# Patient Record
Sex: MALE | Race: WHITE | NOT HISPANIC OR LATINO
[De-identification: names, ages, dates, MRNs, and addresses within clinical notes are randomized per-mention and may not be internally consistent; named-entity substitution may affect disease eponyms.]

---

## 2020-11-25 ENCOUNTER — APPOINTMENT (OUTPATIENT)
Dept: OTOLARYNGOLOGY | Facility: CLINIC | Age: 75
End: 2020-11-25
Payer: MEDICARE

## 2020-11-25 VITALS
HEART RATE: 66 BPM | BODY MASS INDEX: 28.35 KG/M2 | WEIGHT: 198 LBS | TEMPERATURE: 97.3 F | SYSTOLIC BLOOD PRESSURE: 148 MMHG | HEIGHT: 70 IN | DIASTOLIC BLOOD PRESSURE: 83 MMHG

## 2020-11-25 DIAGNOSIS — J34.2 DEVIATED NASAL SEPTUM: ICD-10-CM

## 2020-11-25 DIAGNOSIS — Z86.79 PERSONAL HISTORY OF OTHER DISEASES OF THE CIRCULATORY SYSTEM: ICD-10-CM

## 2020-11-25 DIAGNOSIS — H91.90 UNSPECIFIED HEARING LOSS, UNSPECIFIED EAR: ICD-10-CM

## 2020-11-25 DIAGNOSIS — H91.8X3 OTHER SPECIFIED HEARING LOSS, BILATERAL: ICD-10-CM

## 2020-11-25 DIAGNOSIS — Z86.39 PERSONAL HISTORY OF OTHER ENDOCRINE, NUTRITIONAL AND METABOLIC DISEASE: ICD-10-CM

## 2020-11-25 PROCEDURE — 92557 COMPREHENSIVE HEARING TEST: CPT

## 2020-11-25 PROCEDURE — 92550 TYMPANOMETRY & REFLEX THRESH: CPT

## 2020-11-25 PROCEDURE — 99203 OFFICE O/P NEW LOW 30 MIN: CPT

## 2020-11-25 RX ORDER — ASPIRIN 81 MG
81 TABLET,CHEWABLE ORAL
Refills: 0 | Status: ACTIVE | COMMUNITY

## 2020-11-25 NOTE — ADDENDUM
[FreeTextEntry1] : Documented by Adore Joe acting as scribe for Dr. Mcclain on 11/25/2020.\par \par All Medical record entries made by the Scribe were at my, Dr. Mcclain, direction and personally dictated by me on 11/25/2020 . I have reviewed the chart and agree that the record accurately reflects my personal performance of the history, physical exam, assessment and plan. I have also personally directed, reviewed, and agreed with the discharge instructions.

## 2020-11-25 NOTE — HISTORY OF PRESENT ILLNESS
[de-identified] : The patient presents with hearing loss. Pt doesn't feel that he has a hearing loss but everyone else is the complaining about his hearing. Denies tinnitus or dizziness. 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 He had noise exposure from working at the phone company on circuits and the noise in the environment. He has left work with his ears ringing. Denies FMx of hearing loss. Denies shooting a gun other than when he was in the [employer]. He did not have a hearing test when he began in the . He did have a hearing test when he began at the phone company and at the post office.

## 2020-11-25 NOTE — ASSESSMENT
[FreeTextEntry1] : Audio: bilateral mild sloping to profound SNHL, right 88% discrimination at 90 db, left 72% discrimination at 85 db, tymps nl\par \par Plan:\par Audio. Consider amplification. Discussed r/b/a of of getting amplification sooner rather than later. ABR. FU after test.

## 2020-11-25 NOTE — PHYSICAL EXAM
[Hearing Francis Test (Tuning Fork On Forehead)] : no lateralization of tone [] : septum deviated to the right [Midline] : trachea located in midline position [Normal] : no rashes [FreeTextEntry6] : no pain with motion of the auricle or pressure on the tragus [FreeTextEntry7] : no pain with motion of the auricle or pressure on the tragus  [de-identified] : nontender [de-identified] : nontender [de-identified] : class 1 [FreeTextEntry2] : sinuses nontender to percussion  [de-identified] : sensations intact

## 2020-11-25 NOTE — REVIEW OF SYSTEMS
[Hearing Loss] : hearing loss [Problem Snoring] : problem snoring [Snoring With Pauses] : snoring with pauses

## 2020-11-25 NOTE — CONSULT LETTER
[Dear  ___] : Dear  [unfilled], [Consult Letter:] : I had the pleasure of evaluating your patient, [unfilled]. [Please see my note below.] : Please see my note below. [Consult Closing:] : Thank you very much for allowing me to participate in the care of this patient.  If you have any questions, please do not hesitate to contact me. [Sincerely,] : Sincerely, [FreeTextEntry3] : Jose M Mcclain MD FACS

## 2021-01-01 ENCOUNTER — TRANSCRIPTION ENCOUNTER (OUTPATIENT)
Age: 76
End: 2021-01-01

## 2022-01-01 ENCOUNTER — OUTPATIENT (OUTPATIENT)
Dept: INPATIENT UNIT | Facility: HOSPITAL | Age: 77
LOS: 1 days | End: 2022-01-01
Payer: MEDICARE

## 2022-01-01 ENCOUNTER — NON-APPOINTMENT (OUTPATIENT)
Age: 77
End: 2022-01-01

## 2022-01-01 ENCOUNTER — APPOINTMENT (OUTPATIENT)
Dept: ELECTROPHYSIOLOGY | Facility: CLINIC | Age: 77
End: 2022-01-01
Payer: MEDICARE

## 2022-01-01 ENCOUNTER — TRANSCRIPTION ENCOUNTER (OUTPATIENT)
Age: 77
End: 2022-01-01

## 2022-01-01 ENCOUNTER — INPATIENT (INPATIENT)
Facility: HOSPITAL | Age: 77
LOS: 1 days | Discharge: HOME | DRG: 291 | End: 2022-04-22
Attending: INTERNAL MEDICINE | Admitting: INTERNAL MEDICINE
Payer: MEDICARE

## 2022-01-01 VITALS
HEART RATE: 108 BPM | WEIGHT: 163 LBS | OXYGEN SATURATION: 98 % | HEIGHT: 70 IN | BODY MASS INDEX: 23.34 KG/M2 | DIASTOLIC BLOOD PRESSURE: 73 MMHG | SYSTOLIC BLOOD PRESSURE: 116 MMHG

## 2022-01-01 VITALS
TEMPERATURE: 97 F | OXYGEN SATURATION: 96 % | HEART RATE: 92 BPM | SYSTOLIC BLOOD PRESSURE: 101 MMHG | RESPIRATION RATE: 18 BRPM | DIASTOLIC BLOOD PRESSURE: 58 MMHG

## 2022-01-01 VITALS
RESPIRATION RATE: 18 BRPM | DIASTOLIC BLOOD PRESSURE: 90 MMHG | HEIGHT: 70 IN | SYSTOLIC BLOOD PRESSURE: 115 MMHG | TEMPERATURE: 98 F | OXYGEN SATURATION: 97 % | WEIGHT: 162.92 LBS | HEART RATE: 110 BPM

## 2022-01-01 VITALS
RESPIRATION RATE: 16 BRPM | HEART RATE: 96 BPM | OXYGEN SATURATION: 96 % | DIASTOLIC BLOOD PRESSURE: 80 MMHG | SYSTOLIC BLOOD PRESSURE: 106 MMHG

## 2022-01-01 VITALS
RESPIRATION RATE: 16 BRPM | HEART RATE: 88 BPM | OXYGEN SATURATION: 96 % | SYSTOLIC BLOOD PRESSURE: 97 MMHG | DIASTOLIC BLOOD PRESSURE: 59 MMHG | HEIGHT: 70 IN | TEMPERATURE: 98 F | WEIGHT: 164.91 LBS

## 2022-01-01 VITALS
HEART RATE: 99 BPM | WEIGHT: 165 LBS | SYSTOLIC BLOOD PRESSURE: 114 MMHG | OXYGEN SATURATION: 98 % | DIASTOLIC BLOOD PRESSURE: 78 MMHG | HEIGHT: 70 IN | BODY MASS INDEX: 23.62 KG/M2

## 2022-01-01 DIAGNOSIS — Z01.818 ENCOUNTER FOR OTHER PREPROCEDURAL EXAMINATION: ICD-10-CM

## 2022-01-01 DIAGNOSIS — Z98.890 OTHER SPECIFIED POSTPROCEDURAL STATES: Chronic | ICD-10-CM

## 2022-01-01 DIAGNOSIS — I50.9 HEART FAILURE, UNSPECIFIED: ICD-10-CM

## 2022-01-01 DIAGNOSIS — I25.5 ISCHEMIC CARDIOMYOPATHY: ICD-10-CM

## 2022-01-01 DIAGNOSIS — R06.02 SHORTNESS OF BREATH: ICD-10-CM

## 2022-01-01 DIAGNOSIS — I25.10 ATHEROSCLEROTIC HEART DISEASE OF NATIVE CORONARY ARTERY W/OUT ANGINA PECTORIS: ICD-10-CM

## 2022-01-01 LAB
ALBUMIN SERPL ELPH-MCNC: 3.7 G/DL — SIGNIFICANT CHANGE UP (ref 3.3–5)
ALBUMIN SERPL ELPH-MCNC: 4 G/DL — SIGNIFICANT CHANGE UP (ref 3.3–5)
ALBUMIN SERPL ELPH-MCNC: 4.7 G/DL
ALP BLD-CCNC: 44 U/L
ALP SERPL-CCNC: 38 U/L — LOW (ref 40–120)
ALP SERPL-CCNC: 41 U/L — SIGNIFICANT CHANGE UP (ref 40–120)
ALT FLD-CCNC: 7 U/L — LOW (ref 10–45)
ALT FLD-CCNC: 9 U/L — LOW (ref 10–45)
ALT SERPL-CCNC: 9 U/L
ANION GAP SERPL CALC-SCNC: 16 MMOL/L — SIGNIFICANT CHANGE UP (ref 5–17)
ANION GAP SERPL CALC-SCNC: 16 MMOL/L — SIGNIFICANT CHANGE UP (ref 5–17)
ANION GAP SERPL CALC-SCNC: 17 MMOL/L — SIGNIFICANT CHANGE UP (ref 5–17)
ANION GAP SERPL CALC-SCNC: 19 MMOL/L
AST SERPL-CCNC: 12 U/L — SIGNIFICANT CHANGE UP (ref 10–40)
AST SERPL-CCNC: 13 U/L
AST SERPL-CCNC: 9 U/L — LOW (ref 10–40)
BASE EXCESS BLDV CALC-SCNC: 1.9 MMOL/L — SIGNIFICANT CHANGE UP (ref -2–2)
BASOPHILS # BLD AUTO: 0.05 K/UL — SIGNIFICANT CHANGE UP (ref 0–0.2)
BASOPHILS # BLD AUTO: 0.06 K/UL
BASOPHILS NFR BLD AUTO: 0.5 % — SIGNIFICANT CHANGE UP (ref 0–2)
BASOPHILS NFR BLD AUTO: 0.9 %
BILIRUB SERPL-MCNC: 0.6 MG/DL
BILIRUB SERPL-MCNC: 1 MG/DL — SIGNIFICANT CHANGE UP (ref 0.2–1.2)
BILIRUB SERPL-MCNC: 1.2 MG/DL — SIGNIFICANT CHANGE UP (ref 0.2–1.2)
BLD GP AB SCN SERPL QL: NEGATIVE — SIGNIFICANT CHANGE UP
BUN SERPL-MCNC: 15 MG/DL
BUN SERPL-MCNC: 18 MG/DL — SIGNIFICANT CHANGE UP (ref 7–23)
BUN SERPL-MCNC: 18 MG/DL — SIGNIFICANT CHANGE UP (ref 7–23)
BUN SERPL-MCNC: 23 MG/DL — SIGNIFICANT CHANGE UP (ref 7–23)
CA-I SERPL-SCNC: 1.17 MMOL/L — SIGNIFICANT CHANGE UP (ref 1.15–1.33)
CALCIUM SERPL-MCNC: 9.4 MG/DL — SIGNIFICANT CHANGE UP (ref 8.4–10.5)
CALCIUM SERPL-MCNC: 9.5 MG/DL — SIGNIFICANT CHANGE UP (ref 8.4–10.5)
CALCIUM SERPL-MCNC: 9.6 MG/DL — SIGNIFICANT CHANGE UP (ref 8.4–10.5)
CALCIUM SERPL-MCNC: 9.8 MG/DL
CHLORIDE BLDV-SCNC: 102 MMOL/L — SIGNIFICANT CHANGE UP (ref 96–108)
CHLORIDE SERPL-SCNC: 101 MMOL/L — SIGNIFICANT CHANGE UP (ref 96–108)
CHLORIDE SERPL-SCNC: 101 MMOL/L — SIGNIFICANT CHANGE UP (ref 96–108)
CHLORIDE SERPL-SCNC: 102 MMOL/L — SIGNIFICANT CHANGE UP (ref 96–108)
CHLORIDE SERPL-SCNC: 98 MMOL/L
CO2 BLDV-SCNC: 28 MMOL/L — HIGH (ref 22–26)
CO2 SERPL-SCNC: 20 MMOL/L — LOW (ref 22–31)
CO2 SERPL-SCNC: 20 MMOL/L — LOW (ref 22–31)
CO2 SERPL-SCNC: 22 MMOL/L — SIGNIFICANT CHANGE UP (ref 22–31)
CO2 SERPL-SCNC: 23 MMOL/L
CREAT SERPL-MCNC: 1.14 MG/DL — SIGNIFICANT CHANGE UP (ref 0.5–1.3)
CREAT SERPL-MCNC: 1.31 MG/DL — HIGH (ref 0.5–1.3)
CREAT SERPL-MCNC: 1.34 MG/DL — HIGH (ref 0.5–1.3)
CREAT SERPL-MCNC: 1.38 MG/DL
EGFR: 53 ML/MIN/1.73M2
EGFR: 55 ML/MIN/1.73M2 — LOW
EGFR: 57 ML/MIN/1.73M2 — LOW
EGFR: 67 ML/MIN/1.73M2 — SIGNIFICANT CHANGE UP
EOSINOPHIL # BLD AUTO: 0.03 K/UL — SIGNIFICANT CHANGE UP (ref 0–0.5)
EOSINOPHIL # BLD AUTO: 0.17 K/UL
EOSINOPHIL NFR BLD AUTO: 0.3 % — SIGNIFICANT CHANGE UP (ref 0–6)
EOSINOPHIL NFR BLD AUTO: 2.4 %
GAS PNL BLDV: 135 MMOL/L — LOW (ref 136–145)
GAS PNL BLDV: SIGNIFICANT CHANGE UP
GAS PNL BLDV: SIGNIFICANT CHANGE UP
GLUCOSE BLDV-MCNC: 129 MG/DL — HIGH (ref 70–99)
GLUCOSE SERPL-MCNC: 107 MG/DL — HIGH (ref 70–99)
GLUCOSE SERPL-MCNC: 113 MG/DL — HIGH (ref 70–99)
GLUCOSE SERPL-MCNC: 133 MG/DL — HIGH (ref 70–99)
GLUCOSE SERPL-MCNC: 136 MG/DL
HCO3 BLDV-SCNC: 27 MMOL/L — SIGNIFICANT CHANGE UP (ref 22–29)
HCT VFR BLD CALC: 36.7 % — LOW (ref 39–50)
HCT VFR BLD CALC: 37.9 % — LOW (ref 39–50)
HCT VFR BLD CALC: 43.5 %
HCT VFR BLDA CALC: 38 % — LOW (ref 39–51)
HCV AB S/CO SERPL IA: 0.14 S/CO — SIGNIFICANT CHANGE UP (ref 0–0.99)
HCV AB SERPL-IMP: SIGNIFICANT CHANGE UP
HGB BLD CALC-MCNC: 12.8 G/DL — SIGNIFICANT CHANGE UP (ref 12.6–17.4)
HGB BLD-MCNC: 12.4 G/DL — LOW (ref 13–17)
HGB BLD-MCNC: 12.7 G/DL — LOW (ref 13–17)
HGB BLD-MCNC: 14.2 G/DL
IMM GRANULOCYTES NFR BLD AUTO: 0.3 %
IMM GRANULOCYTES NFR BLD AUTO: 0.6 % — SIGNIFICANT CHANGE UP (ref 0–1.5)
INR PPP: 1.08 RATIO
LACTATE BLDV-MCNC: 1.9 MMOL/L — SIGNIFICANT CHANGE UP (ref 0.7–2)
LYMPHOCYTES # BLD AUTO: 0.6 K/UL — LOW (ref 1–3.3)
LYMPHOCYTES # BLD AUTO: 1.13 K/UL
LYMPHOCYTES # BLD AUTO: 5.8 % — LOW (ref 13–44)
LYMPHOCYTES NFR BLD AUTO: 16.1 %
MAGNESIUM SERPL-MCNC: 2 MG/DL — SIGNIFICANT CHANGE UP (ref 1.6–2.6)
MAN DIFF?: NORMAL
MCHC RBC-ENTMCNC: 32.1 PG — SIGNIFICANT CHANGE UP (ref 27–34)
MCHC RBC-ENTMCNC: 32.2 PG — SIGNIFICANT CHANGE UP (ref 27–34)
MCHC RBC-ENTMCNC: 32.3 PG
MCHC RBC-ENTMCNC: 32.6 GM/DL
MCHC RBC-ENTMCNC: 33.5 GM/DL — SIGNIFICANT CHANGE UP (ref 32–36)
MCHC RBC-ENTMCNC: 33.8 GM/DL — SIGNIFICANT CHANGE UP (ref 32–36)
MCV RBC AUTO: 95.1 FL — SIGNIFICANT CHANGE UP (ref 80–100)
MCV RBC AUTO: 95.9 FL — SIGNIFICANT CHANGE UP (ref 80–100)
MCV RBC AUTO: 98.9 FL
MONOCYTES # BLD AUTO: 0.63 K/UL
MONOCYTES # BLD AUTO: 0.94 K/UL — HIGH (ref 0–0.9)
MONOCYTES NFR BLD AUTO: 9 %
MONOCYTES NFR BLD AUTO: 9.1 % — SIGNIFICANT CHANGE UP (ref 2–14)
NEUTROPHILS # BLD AUTO: 4.99 K/UL
NEUTROPHILS # BLD AUTO: 8.68 K/UL — HIGH (ref 1.8–7.4)
NEUTROPHILS NFR BLD AUTO: 71.3 %
NEUTROPHILS NFR BLD AUTO: 83.7 % — HIGH (ref 43–77)
NRBC # BLD: 0 /100 WBCS — SIGNIFICANT CHANGE UP (ref 0–0)
NRBC # BLD: 0 /100 WBCS — SIGNIFICANT CHANGE UP (ref 0–0)
NT-PROBNP SERPL-SCNC: HIGH PG/ML (ref 0–300)
NT-PROBNP SERPL-SCNC: HIGH PG/ML (ref 0–300)
PCO2 BLDV: 41 MMHG — LOW (ref 42–55)
PH BLDV: 7.42 — SIGNIFICANT CHANGE UP (ref 7.32–7.43)
PLATELET # BLD AUTO: 186 K/UL — SIGNIFICANT CHANGE UP (ref 150–400)
PLATELET # BLD AUTO: 198 K/UL — SIGNIFICANT CHANGE UP (ref 150–400)
PLATELET # BLD AUTO: 293 K/UL
PO2 BLDV: 26 MMHG — SIGNIFICANT CHANGE UP (ref 25–45)
POTASSIUM BLDV-SCNC: 3.7 MMOL/L — SIGNIFICANT CHANGE UP (ref 3.5–5.1)
POTASSIUM SERPL-MCNC: 3.4 MMOL/L — LOW (ref 3.5–5.3)
POTASSIUM SERPL-MCNC: 3.6 MMOL/L — SIGNIFICANT CHANGE UP (ref 3.5–5.3)
POTASSIUM SERPL-MCNC: 4.4 MMOL/L — SIGNIFICANT CHANGE UP (ref 3.5–5.3)
POTASSIUM SERPL-SCNC: 3.4 MMOL/L — LOW (ref 3.5–5.3)
POTASSIUM SERPL-SCNC: 3.6 MMOL/L — SIGNIFICANT CHANGE UP (ref 3.5–5.3)
POTASSIUM SERPL-SCNC: 4.2 MMOL/L
POTASSIUM SERPL-SCNC: 4.4 MMOL/L — SIGNIFICANT CHANGE UP (ref 3.5–5.3)
PROT SERPL-MCNC: 6.5 G/DL — SIGNIFICANT CHANGE UP (ref 6–8.3)
PROT SERPL-MCNC: 6.6 G/DL — SIGNIFICANT CHANGE UP (ref 6–8.3)
PROT SERPL-MCNC: 7.4 G/DL
PT BLD: 12.6 SEC
RAPID RVP RESULT: SIGNIFICANT CHANGE UP
RBC # BLD: 3.86 M/UL — LOW (ref 4.2–5.8)
RBC # BLD: 3.95 M/UL — LOW (ref 4.2–5.8)
RBC # BLD: 4.4 M/UL
RBC # FLD: 14.2 % — SIGNIFICANT CHANGE UP (ref 10.3–14.5)
RBC # FLD: 14.6 %
RBC # FLD: 14.6 % — HIGH (ref 10.3–14.5)
RH IG SCN BLD-IMP: POSITIVE — SIGNIFICANT CHANGE UP
RH IG SCN BLD-IMP: POSITIVE — SIGNIFICANT CHANGE UP
SAO2 % BLDV: 37.7 % — LOW (ref 67–88)
SARS-COV-2 N GENE NPH QL NAA+PROBE: NOT DETECTED
SARS-COV-2 RNA SPEC QL NAA+PROBE: SIGNIFICANT CHANGE UP
SODIUM SERPL-SCNC: 137 MMOL/L — SIGNIFICANT CHANGE UP (ref 135–145)
SODIUM SERPL-SCNC: 138 MMOL/L — SIGNIFICANT CHANGE UP (ref 135–145)
SODIUM SERPL-SCNC: 140 MMOL/L
SODIUM SERPL-SCNC: 140 MMOL/L — SIGNIFICANT CHANGE UP (ref 135–145)
TROPONIN T, HIGH SENSITIVITY RESULT: 48 NG/L — SIGNIFICANT CHANGE UP (ref 0–51)
WBC # BLD: 10.36 K/UL — SIGNIFICANT CHANGE UP (ref 3.8–10.5)
WBC # BLD: 7.93 K/UL — SIGNIFICANT CHANGE UP (ref 3.8–10.5)
WBC # FLD AUTO: 10.36 K/UL — SIGNIFICANT CHANGE UP (ref 3.8–10.5)
WBC # FLD AUTO: 7 K/UL
WBC # FLD AUTO: 7.93 K/UL — SIGNIFICANT CHANGE UP (ref 3.8–10.5)

## 2022-01-01 PROCEDURE — 85018 HEMOGLOBIN: CPT

## 2022-01-01 PROCEDURE — 83880 ASSAY OF NATRIURETIC PEPTIDE: CPT

## 2022-01-01 PROCEDURE — 85014 HEMATOCRIT: CPT

## 2022-01-01 PROCEDURE — 36415 COLL VENOUS BLD VENIPUNCTURE: CPT

## 2022-01-01 PROCEDURE — 84295 ASSAY OF SERUM SODIUM: CPT

## 2022-01-01 PROCEDURE — 82947 ASSAY GLUCOSE BLOOD QUANT: CPT

## 2022-01-01 PROCEDURE — 80053 COMPREHEN METABOLIC PANEL: CPT

## 2022-01-01 PROCEDURE — 93308 TTE F-UP OR LMTD: CPT

## 2022-01-01 PROCEDURE — 93005 ELECTROCARDIOGRAM TRACING: CPT

## 2022-01-01 PROCEDURE — 83605 ASSAY OF LACTIC ACID: CPT

## 2022-01-01 PROCEDURE — 71046 X-RAY EXAM CHEST 2 VIEWS: CPT

## 2022-01-01 PROCEDURE — 85025 COMPLETE CBC W/AUTO DIFF WBC: CPT

## 2022-01-01 PROCEDURE — 82435 ASSAY OF BLOOD CHLORIDE: CPT

## 2022-01-01 PROCEDURE — C1722: CPT

## 2022-01-01 PROCEDURE — 80048 BASIC METABOLIC PNL TOTAL CA: CPT

## 2022-01-01 PROCEDURE — 93282 PRGRMG EVAL IMPLANTABLE DFB: CPT

## 2022-01-01 PROCEDURE — 93308 TTE F-UP OR LMTD: CPT | Mod: 26

## 2022-01-01 PROCEDURE — 71045 X-RAY EXAM CHEST 1 VIEW: CPT | Mod: 26

## 2022-01-01 PROCEDURE — 99024 POSTOP FOLLOW-UP VISIT: CPT

## 2022-01-01 PROCEDURE — 71046 X-RAY EXAM CHEST 2 VIEWS: CPT | Mod: 26

## 2022-01-01 PROCEDURE — 99285 EMERGENCY DEPT VISIT HI MDM: CPT

## 2022-01-01 PROCEDURE — 99223 1ST HOSP IP/OBS HIGH 75: CPT

## 2022-01-01 PROCEDURE — C1769: CPT

## 2022-01-01 PROCEDURE — 82330 ASSAY OF CALCIUM: CPT

## 2022-01-01 PROCEDURE — 84132 ASSAY OF SERUM POTASSIUM: CPT

## 2022-01-01 PROCEDURE — 93321 DOPPLER ECHO F-UP/LMTD STD: CPT | Mod: 26

## 2022-01-01 PROCEDURE — 86900 BLOOD TYPING SEROLOGIC ABO: CPT

## 2022-01-01 PROCEDURE — 93321 DOPPLER ECHO F-UP/LMTD STD: CPT

## 2022-01-01 PROCEDURE — C1894: CPT

## 2022-01-01 PROCEDURE — 86850 RBC ANTIBODY SCREEN: CPT

## 2022-01-01 PROCEDURE — 71045 X-RAY EXAM CHEST 1 VIEW: CPT

## 2022-01-01 PROCEDURE — 0225U NFCT DS DNA&RNA 21 SARSCOV2: CPT

## 2022-01-01 PROCEDURE — 85027 COMPLETE CBC AUTOMATED: CPT

## 2022-01-01 PROCEDURE — 86803 HEPATITIS C AB TEST: CPT

## 2022-01-01 PROCEDURE — C1777: CPT

## 2022-01-01 PROCEDURE — 33249 INSJ/RPLCMT DEFIB W/LEAD(S): CPT

## 2022-01-01 PROCEDURE — 99285 EMERGENCY DEPT VISIT HI MDM: CPT | Mod: 25

## 2022-01-01 PROCEDURE — 93010 ELECTROCARDIOGRAM REPORT: CPT

## 2022-01-01 PROCEDURE — 83735 ASSAY OF MAGNESIUM: CPT

## 2022-01-01 PROCEDURE — 82803 BLOOD GASES ANY COMBINATION: CPT

## 2022-01-01 PROCEDURE — 99204 OFFICE O/P NEW MOD 45 MIN: CPT

## 2022-01-01 PROCEDURE — 86901 BLOOD TYPING SEROLOGIC RH(D): CPT

## 2022-01-01 PROCEDURE — 93000 ELECTROCARDIOGRAM COMPLETE: CPT

## 2022-01-01 PROCEDURE — 84484 ASSAY OF TROPONIN QUANT: CPT

## 2022-01-01 RX ORDER — LOSARTAN POTASSIUM 100 MG/1
12.5 TABLET, FILM COATED ORAL DAILY
Refills: 0 | Status: DISCONTINUED | OUTPATIENT
Start: 2022-01-01 | End: 2022-01-01

## 2022-01-01 RX ORDER — LOSARTAN POTASSIUM 100 MG/1
0.5 TABLET, FILM COATED ORAL
Qty: 0 | Refills: 0 | DISCHARGE

## 2022-01-01 RX ORDER — METOPROLOL TARTRATE 50 MG
1 TABLET ORAL
Qty: 0 | Refills: 0 | DISCHARGE

## 2022-01-01 RX ORDER — OXYCODONE HYDROCHLORIDE 5 MG/1
1 TABLET ORAL
Qty: 8 | Refills: 0
Start: 2022-01-01 | End: 2022-01-01

## 2022-01-01 RX ORDER — CLOPIDOGREL BISULFATE 75 MG/1
75 TABLET, FILM COATED ORAL DAILY
Refills: 0 | Status: DISCONTINUED | OUTPATIENT
Start: 2022-01-01 | End: 2022-01-01

## 2022-01-01 RX ORDER — HEPARIN SODIUM 5000 [USP'U]/ML
5000 INJECTION INTRAVENOUS; SUBCUTANEOUS EVERY 12 HOURS
Refills: 0 | Status: DISCONTINUED | OUTPATIENT
Start: 2022-01-01 | End: 2022-01-01

## 2022-01-01 RX ORDER — POTASSIUM CHLORIDE 750 MG/1
10 TABLET, FILM COATED, EXTENDED RELEASE ORAL
Qty: 90 | Refills: 0 | Status: ACTIVE | COMMUNITY
Start: 2022-01-01

## 2022-01-01 RX ORDER — ASPIRIN/CALCIUM CARB/MAGNESIUM 324 MG
81 TABLET ORAL DAILY
Refills: 0 | Status: DISCONTINUED | OUTPATIENT
Start: 2022-01-01 | End: 2022-01-01

## 2022-01-01 RX ORDER — METOPROLOL SUCCINATE 25 MG/1
25 TABLET, EXTENDED RELEASE ORAL
Qty: 30 | Refills: 0 | Status: ACTIVE | COMMUNITY
Start: 2022-01-01

## 2022-01-01 RX ORDER — OXYCODONE HYDROCHLORIDE 5 MG/1
5 TABLET ORAL EVERY 6 HOURS
Refills: 0 | Status: DISCONTINUED | OUTPATIENT
Start: 2022-01-01 | End: 2022-01-01

## 2022-01-01 RX ORDER — ACETAMINOPHEN 500 MG
650 TABLET ORAL EVERY 6 HOURS
Refills: 0 | Status: DISCONTINUED | OUTPATIENT
Start: 2022-01-01 | End: 2022-01-01

## 2022-01-01 RX ORDER — POTASSIUM CHLORIDE 20 MEQ
1 PACKET (EA) ORAL
Qty: 0 | Refills: 0 | DISCHARGE

## 2022-01-01 RX ORDER — FUROSEMIDE 40 MG
80 TABLET ORAL ONCE
Refills: 0 | Status: COMPLETED | OUTPATIENT
Start: 2022-01-01 | End: 2022-01-01

## 2022-01-01 RX ORDER — FAMOTIDINE 20 MG/1
20 TABLET, FILM COATED ORAL
Qty: 180 | Refills: 3 | Status: ACTIVE | COMMUNITY
Start: 2022-01-01

## 2022-01-01 RX ORDER — LOSARTAN POTASSIUM 100 MG/1
1 TABLET, FILM COATED ORAL
Qty: 0 | Refills: 0 | DISCHARGE

## 2022-01-01 RX ORDER — PANTOPRAZOLE SODIUM 20 MG/1
40 TABLET, DELAYED RELEASE ORAL
Refills: 0 | Status: DISCONTINUED | OUTPATIENT
Start: 2022-01-01 | End: 2022-01-01

## 2022-01-01 RX ORDER — ALPRAZOLAM 0.25 MG
0.25 TABLET ORAL AT BEDTIME
Refills: 0 | Status: DISCONTINUED | OUTPATIENT
Start: 2022-01-01 | End: 2022-01-01

## 2022-01-01 RX ORDER — FUROSEMIDE 40 MG/1
40 TABLET ORAL DAILY
Qty: 14 | Refills: 0 | Status: ACTIVE | COMMUNITY
Start: 2022-01-01

## 2022-01-01 RX ORDER — ALPRAZOLAM 0.25 MG
1 TABLET ORAL
Qty: 0 | Refills: 0 | DISCHARGE

## 2022-01-01 RX ORDER — METOPROLOL TARTRATE 50 MG
25 TABLET ORAL DAILY
Refills: 0 | Status: DISCONTINUED | OUTPATIENT
Start: 2022-01-01 | End: 2022-01-01

## 2022-01-01 RX ORDER — FUROSEMIDE 40 MG
40 TABLET ORAL ONCE
Refills: 0 | Status: COMPLETED | OUTPATIENT
Start: 2022-01-01 | End: 2022-01-01

## 2022-01-01 RX ORDER — FUROSEMIDE 40 MG
1 TABLET ORAL
Qty: 0 | Refills: 0 | DISCHARGE

## 2022-01-01 RX ORDER — LOSARTAN POTASSIUM 25 MG/1
25 TABLET, FILM COATED ORAL DAILY
Qty: 30 | Refills: 0 | Status: ACTIVE | COMMUNITY
Start: 2022-01-01

## 2022-01-01 RX ORDER — ACETAMINOPHEN 500 MG
1000 TABLET ORAL ONCE
Refills: 0 | Status: COMPLETED | OUTPATIENT
Start: 2022-01-01 | End: 2022-01-01

## 2022-01-01 RX ORDER — METOPROLOL SUCCINATE 100 MG
100 TABLET, EXTENDED RELEASE 24 HR ORAL
Refills: 0 | Status: DISCONTINUED | COMMUNITY
End: 2022-01-01

## 2022-01-01 RX ORDER — FUROSEMIDE 40 MG
40 TABLET ORAL
Refills: 0 | Status: DISCONTINUED | OUTPATIENT
Start: 2022-01-01 | End: 2022-01-01

## 2022-01-01 RX ORDER — ASPIRIN/CALCIUM CARB/MAGNESIUM 324 MG
1 TABLET ORAL
Qty: 0 | Refills: 0 | DISCHARGE

## 2022-01-01 RX ORDER — FAMOTIDINE 10 MG/ML
1 INJECTION INTRAVENOUS
Qty: 0 | Refills: 0 | DISCHARGE

## 2022-01-01 RX ORDER — ALPRAZOLAM 0.5 MG/1
0.5 TABLET ORAL
Qty: 21 | Refills: 0 | Status: ACTIVE | COMMUNITY
Start: 2022-01-01

## 2022-01-01 RX ORDER — LOSARTAN POTASSIUM 100 MG/1
25 TABLET, FILM COATED ORAL DAILY
Refills: 0 | Status: DISCONTINUED | OUTPATIENT
Start: 2022-01-01 | End: 2022-01-01

## 2022-01-01 RX ORDER — ALPRAZOLAM 0.25 MG
2 TABLET ORAL
Qty: 0 | Refills: 0 | DISCHARGE

## 2022-01-01 RX ORDER — CLOPIDOGREL BISULFATE 75 MG/1
75 TABLET, FILM COATED ORAL DAILY
Qty: 1 | Refills: 1 | Status: ACTIVE | COMMUNITY
Start: 2022-01-01

## 2022-01-01 RX ORDER — ACETAMINOPHEN 500 MG
2 TABLET ORAL
Qty: 0 | Refills: 0 | DISCHARGE
Start: 2022-01-01

## 2022-01-01 RX ORDER — POTASSIUM CHLORIDE 20 MEQ
40 PACKET (EA) ORAL EVERY 4 HOURS
Refills: 0 | Status: COMPLETED | OUTPATIENT
Start: 2022-01-01 | End: 2022-01-01

## 2022-01-01 RX ORDER — OXYCODONE HYDROCHLORIDE 5 MG/1
1 TABLET ORAL
Qty: 0 | Refills: 0 | DISCHARGE
Start: 2022-01-01

## 2022-01-01 RX ORDER — CEFAZOLIN SODIUM 1 G
2000 VIAL (EA) INJECTION ONCE
Refills: 0 | Status: COMPLETED | OUTPATIENT
Start: 2022-01-01 | End: 2022-01-01

## 2022-01-01 RX ORDER — CLOPIDOGREL BISULFATE 75 MG/1
1 TABLET, FILM COATED ORAL
Qty: 0 | Refills: 0 | DISCHARGE

## 2022-01-01 RX ADMIN — Medication 40 MILLIEQUIVALENT(S): at 10:35

## 2022-01-01 RX ADMIN — CLOPIDOGREL BISULFATE 75 MILLIGRAM(S): 75 TABLET, FILM COATED ORAL at 11:50

## 2022-01-01 RX ADMIN — CLOPIDOGREL BISULFATE 75 MILLIGRAM(S): 75 TABLET, FILM COATED ORAL at 13:31

## 2022-01-01 RX ADMIN — Medication 40 MILLIGRAM(S): at 05:36

## 2022-01-01 RX ADMIN — Medication 81 MILLIGRAM(S): at 11:37

## 2022-01-01 RX ADMIN — Medication 0.25 MILLIGRAM(S): at 02:18

## 2022-01-01 RX ADMIN — Medication 81 MILLIGRAM(S): at 13:31

## 2022-01-01 RX ADMIN — HEPARIN SODIUM 5000 UNIT(S): 5000 INJECTION INTRAVENOUS; SUBCUTANEOUS at 06:06

## 2022-01-01 RX ADMIN — HEPARIN SODIUM 5000 UNIT(S): 5000 INJECTION INTRAVENOUS; SUBCUTANEOUS at 18:22

## 2022-01-01 RX ADMIN — Medication 81 MILLIGRAM(S): at 11:50

## 2022-01-01 RX ADMIN — PANTOPRAZOLE SODIUM 40 MILLIGRAM(S): 20 TABLET, DELAYED RELEASE ORAL at 05:39

## 2022-01-01 RX ADMIN — Medication 40 MILLIGRAM(S): at 18:17

## 2022-01-01 RX ADMIN — Medication 25 MILLIGRAM(S): at 05:38

## 2022-01-01 RX ADMIN — Medication 25 MILLIGRAM(S): at 06:06

## 2022-01-01 RX ADMIN — LOSARTAN POTASSIUM 12.5 MILLIGRAM(S): 100 TABLET, FILM COATED ORAL at 05:37

## 2022-01-01 RX ADMIN — Medication 40 MILLIGRAM(S): at 06:05

## 2022-01-01 RX ADMIN — PANTOPRAZOLE SODIUM 40 MILLIGRAM(S): 20 TABLET, DELAYED RELEASE ORAL at 06:06

## 2022-01-01 RX ADMIN — Medication 400 MILLIGRAM(S): at 12:36

## 2022-01-01 RX ADMIN — Medication 1000 MILLIGRAM(S): at 13:36

## 2022-01-01 RX ADMIN — Medication 40 MILLIEQUIVALENT(S): at 13:31

## 2022-01-01 RX ADMIN — HEPARIN SODIUM 5000 UNIT(S): 5000 INJECTION INTRAVENOUS; SUBCUTANEOUS at 18:17

## 2022-01-01 RX ADMIN — Medication 40 MILLIGRAM(S): at 18:22

## 2022-01-01 RX ADMIN — LOSARTAN POTASSIUM 25 MILLIGRAM(S): 100 TABLET, FILM COATED ORAL at 06:05

## 2022-01-01 RX ADMIN — Medication 0.25 MILLIGRAM(S): at 22:13

## 2022-01-01 RX ADMIN — HEPARIN SODIUM 5000 UNIT(S): 5000 INJECTION INTRAVENOUS; SUBCUTANEOUS at 05:35

## 2022-01-01 RX ADMIN — Medication 40 MILLIGRAM(S): at 10:40

## 2022-03-28 PROBLEM — R06.02 SHORTNESS OF BREATH: Status: ACTIVE | Noted: 2022-01-01

## 2022-03-29 NOTE — HISTORY OF PRESENT ILLNESS
[FreeTextEntry1] : I had the pleasure of seeing your patient Roger Irwin today in the arrhythmia clinic of [facility]. As you well know the patient is a delightful 76 year old man with a histroy of ischemic CM and recent MI. The patient has a prior history of CAD including multiple stents over the years at [facility] (starting [id number] with 7 stents). The patient was in [place] and suffered a STEMI on 1/6/22. He was taken to the cath lab and found to have an occluded LAD. An attempt was made to to open the vessel with only sluggish flow. He had disease in his circumflex system and RCA as well. His EF post was 30-35% initially (It was 45% prior according to the patients wife). He was given a lifevest and returned to [place]. A more recent echo demonstrates an EF of 20% according to his wife. He was seen by the heart failure team at [place]. He did not tolerate Entresto due to hypotention (again according to the patients wife). I spoke with Dr. Richards who saw him recently for heart failure. She reviewed his angiogram with her team and it was felt that there was nothing to intervene upon with CABG or PCI. \par \par Overall the patient is doing fairly well. He wants to continue cardiac rehab, but was having PVCs and this was put on hold. He denies palpitations, lightheadedness, pre-syncope or syncope. His BP today was 116/73 and his pulse 105. His ECG demonstrated sinus tachycardia at 108 and evidence of an mana-septal MI.

## 2022-03-29 NOTE — DISCUSSION/SUMMARY
[FreeTextEntry1] : In summary the patient is a 76 year old man with ischemic CM, status post a recent MI and PCI. I discussed with the patient and his wife that there was no data to support the use of a life vest (The VEST trial was negative, with a trend toward mortality driven by 4 strokes in the non-Vest arm-not sudden death). he is very happy to stop wearing the vest. We also received the data supporting ICD therapy for primary prevention of sudden death. We discussed he pros and cons of ICD therapy and the procedures and risks. I advised them that he would need a repeat echocardiogram 3 months after his PCI (After 4/6/22) and if the EF was still <35% (whuch it most likely will be) he would be a candidate for an ICD. We discussed the LEADR trial (3830D ICD lead, based on the 3830 4Fr select secure pacing lead). He is amenable to enrolling.

## 2022-04-07 PROBLEM — Z01.818 PREOP TESTING: Status: ACTIVE | Noted: 2022-01-01

## 2022-04-14 NOTE — ASU DISCHARGE PLAN (ADULT/PEDIATRIC) - CARE PROVIDER_API CALL
Hayley Mcdaniel)  Cardiac Electrophysiology; Cardiovascular Disease; Internal Medicine  [address] [place] [address]  Phone: [phone]  Fax: [phone]  Scheduled Appointment: 04/26/2022 11:40 AM

## 2022-04-14 NOTE — ASU DISCHARGE PLAN (ADULT/PEDIATRIC) - ASU DC SPECIAL INSTRUCTIONSFT
WOUND CARE:  Do NOT scrub, rub, or pick at your incision site  AFTER 3 DAYS you may SHOWER  - use mild soap and gentle warm, water stream, pat dry  DO NOT apply lotions, creams, ointments, powder, parfumes to your incision site  DO NOT SOAK your site for 4-6 weeks ( no baths, no pools, no tubs, etc...)  wear loose clothing around site for 1-2 weeks  IF surgical tape was used DO NOT remove the strips, they will fall off after 7days, if glue was used, it will naturally fall off within 3 weeks  if staples were used, they will b eremoved in 7-10 days by your doctor    ACTIVITY:  for 2 weeks AFTER  your procedure  - DO NOT RAISE your arm above shoulder level ( on the same side of your incision)  for 4 weeks AFTER your procedure   - DO NOT LIFT anything 10 lbs or heavier ( on the side of your impalnt)   - certain activities may be limited longer, those that involve swinging your arm, and will be discuseed with your EP doctor  DO NOT DRIVE until your EP Doctor or nurse practitioner/ physician assistant states it is safe to do so  A follow up appointment in 7-14 days will be arranged before your discharge    ID CARD:   you will receive an ID CARD and device company booklet   - please carry that card with you at all times    ***CALL YOUR DOCTOR ***  IF you have fever, chils, body aches, or severe pain, swelling, redness, heat, yellow drainage from your incision site  IF bleeding  or significnat new swelling from your puncture site  IF your experience lightheadness, dizziness, or fainting spell.  If you are unable to reach your doctor, you may contact Cardiology Office at [facility] at [phone]  IF you experience a SINGLE SHOCK and feel okay please call EP clinic   IF your experience MULTIPLE SHOCKS or signle shock and you are NOT FEELING good, call [phone] or have someone take you to nearest ER      Follow heart healthy diet reccomended by your doctor, , if you smoke STOP SMOKING ( may call [phone] for center of tobacco control if you need assistance)

## 2022-04-14 NOTE — CHART NOTE - NSCHARTNOTEFT_GEN_A_CORE
Search Terms: laura irwin, 1945Search Date: 04/14/2022 10:15:37 AM    The Drug Utilization Report below displays all of the controlled substance prescriptions, if any, that your patient has filled in the last twelve months. The information displayed on this report is compiled from pharmacy submissions to the Department, and accurately reflects the information as submitted by the pharmacies.    This report was requested by: Janna Colindres | Reference #: [id number]    Others' Prescriptions  Patient Name: Laura IrwinBirth Date: 1945  Address: [address] [place] [id number]: Male  Rx Written	Rx Dispensed	Drug	Quantity	Days Supply	Prescriber Name	Prescriber Ruth #	Payment Method	Dispenser  04/06/2022	04/06/2022	alprazolam 0.25 mg tablet	90	30	Nicolasa Shepard MD	[id number]	Medicare	[id number]

## 2022-04-14 NOTE — CHART NOTE - NSCHARTNOTEFT_GEN_A_CORE
S/P /ICD implant     T(C): 36.4 (04-14-22 @ 07:10), Max: 36.4 (04-14-22 @ 07:10)  HR: 103 (04-14-22 @ 10:10) (100 - 110)  BP: 104/83 (04-14-22 @ 10:10) (104/83 - 118/96)  RR: 17 (04-14-22 @ 10:10) (16 - 18)  SpO2: 92% (04-14-22 @ 10:10) (92% - 97%)    Left infraclavicular implant site no hematoma, no bleeding, no ecchymosis  Post Device teaching done with patient and fiancee' by nurse   ICD/ card, booklet, and discharge instruction given to patient by primary nurse   awaiting CXR to r/o pneumothorax and check lead tip placement  anticipate discharge home today after CXR, if remains stable  f.u paresh check appt 4/26 at 11:40 am S/P /ICD implant     T(C): 36.4 (04-14-22 @ 07:10), Max: 36.4 (04-14-22 @ 07:10)  HR: 103 (04-14-22 @ 10:10) (100 - 110)  BP: 104/83 (04-14-22 @ 10:10) (104/83 - 118/96)  RR: 17 (04-14-22 @ 10:10) (16 - 18)  SpO2: 92% (04-14-22 @ 10:10) (92% - 97%)    Left infraclavicular implant site no hematoma, no bleeding, no ecchymosis  Post Device teaching done with patient and fiancee' by nurse   ICD/ card, booklet, and discharge instruction given to patient by primary nurse   awaiting CXR to r/o pneumothorax and check lead tip placement  anticipate discharge home today after CXR, if remains stable  f.u wound check appt 4/26 at 11:40 am    ADDENDUM TO ABOVE NOTE  called at bedside by nurse: Left infraclavicular implant site with "slight" blood oozing  went to bed side, site compressed, no hematoma, no further bleeding noted at this time   Dr Mcdaniel made aware and came to bedside as well  pocket press applied ( as per conversation with Dr Mcdaniel) for further compression, pt is to remove it tomorrow morning   CXR as per below   pt cleared for discharge home at this time           ACC: [id number] EXAM:  XR CHEST PA LAT 2V                          PROCEDURE DATE:  04/14/2022          INTERPRETATION:  CLINICAL INDICATION:  Status post ICD implant    COMPARISON: None available.    TECHNIQUE: Frontal radiograph of the chest.    FINDINGS:    Support devices: An AICD overlies the left chest wall with its lead   intact.    Cardiac/mediastinum/hilum: Heart size cannot be accurately assessed on   this projection.    Lung parenchyma/Pleura: Mild bilateral lower lung atelectasis. There is   no definite pleural effusion. There is no pneumothorax.    Skeleton/soft tissues: No acute osseous abnormalities.    IMPRESSION:    AICD overlies left chest wall with its lead intact.    No pneumothorax.    Mild bilateral lower lung atelectasis.

## 2022-04-14 NOTE — H&P CARDIOLOGY - NS ATTEND AMEND GEN_ALL_CORE FT
seen and agree  please see clinic note for details of discussion  repeat echo > 3 mos post PCI revealed persistent LV dysfunction with EF 30%  Therefore plan for primary prevention ICD

## 2022-04-14 NOTE — H&P CARDIOLOGY - NSICDXPASTMEDICALHX_GEN_ALL_CORE_FT
Subjective:       Patient ID:  Serenity Epps is a 80 y.o. female who presents for   Chief Complaint   Patient presents with    Results    Rash     Patient here for follow up pruritic rash on her back Pruritic dermatitis & xerosis cutis treating with Elocon 0.1% cream. She was recently hospitalized with a broken femur. Overall itching improved.  used the prescribed Ivermectin (STROMECTOL) 3 mg Tabs only once. Has not taken 2nd dose. History depression/anxiety and neuropathic pain. Taking cymbalta and gabapentin intermittently. Admits to forgetting doses regularly. Stress makes itch worse- family member with substance abuse issues.     FINAL PATHOLOGIC DIAGNOSIS   [facility] PATHOLOGY DIAGNOSIS:   LEFT ARM:   Minimal perivascular dermatitis.   See note.   Note: This pattern is nonspecific. A drug reaction with viral exanthema might be considered. No fungal organisms   were identified on PAS stained sections.   Red Fontenot M.D., FCAP        Itching began on posterior scalp- Improved with head and shoulders . Now appears on neck , shoulders, abd , arm & Right knee. Begins as red & firey , small bumps & itchy. Takes very hot showers.  Pt treated w/ topical benadryl , vinegar & OTC lotions  Pt denies any new medications prior to rash   Pt began Duloxetine in Jan 2018 - after the rash  No one else in house hold has rash .  Inspects mattresses for warranty.      No phx skin ca   Past Medical History:  No date: Anxiety  No date: Arthritis  No date: Cataract - Both Eyes      Comment: OU done//  9/21/2016: CKD (chronic kidney disease), stage II  No date: Diverticulosis  No date: DVT (deep venous thrombosis)      Comment: left leg, after childbirth  No date: DVT (deep venous thrombosis)  No date: DVT (deep venous thrombosis)  2/20/2014: Exudative age-related macular degeneration of *  No date: Glaucoma  No date: Hypertension  No date: Irritable bowel syndrome  No date: Left foot pain      Comment: chronic  No date: Macular  degeneration      Comment: bimonthly intraoccular injections  No date: Osteopenia  No date: Rhinitis, chronic  No date: Strabismus      Comment: as child          not affected  Itching worse at night                Review of Systems   Skin: Positive for itching and rash. Negative for daily sunscreen use.   Psychiatric/Behavioral: Positive for anxiety and high stress.        Objective:    Physical Exam   Constitutional: She appears well-developed and well-nourished. She is in a wheelchair.  No distress.   HENT:   Mouth/Throat: Lips normal.    Eyes: Lids are normal.  No conjunctival no injection.   Cardiovascular: There is no local extremity swelling and no dependent edema.     Neurological: She is alert and oriented to person, place, and time. She is not disoriented.   Psychiatric: She has a normal mood and affect.   Skin:   Areas Examined (abnormalities noted in diagram):   Head / Face Inspection Performed  Neck Inspection Performed  Chest / Axilla Inspection Performed  Back Inspection Performed  RUE Inspected  LUE Inspection Performed              Diagram Legend     Erythematous scaling macule/papule c/w actinic keratosis       Vascular papule c/w angioma      Pigmented verrucoid papule/plaque c/w seborrheic keratosis      Yellow umbilicated papule c/w sebaceous hyperplasia      Irregularly shaped tan macule c/w lentigo     1-2 mm smooth white papules consistent with Milia      Movable subcutaneous cyst with punctum c/w epidermal inclusion cyst      Subcutaneous movable cyst c/w pilar cyst      Firm pink to brown papule c/w dermatofibroma      Pedunculated fleshy papule(s) c/w skin tag(s)      Evenly pigmented macule c/w junctional nevus     Mildly variegated pigmented, slightly irregular-bordered macule c/w mildly atypical nevus      Flesh colored to evenly pigmented papule c/w intradermal nevus       Pink pearly papule/plaque c/w basal cell carcinoma      Erythematous hyperkeratotic cursted plaque c/w SCC  "     Surgical scar with no sign of skin cancer recurrence      Open and closed comedones      Inflammatory papules and pustules      Verrucoid papule consistent consistent with wart     Erythematous eczematous patches and plaques     Dystrophic onycholytic nail with subungual debris c/w onychomycosis     Umbilicated papule    Erythematous-base heme-crusted tan verrucoid plaque consistent with inflamed seborrheic keratosis     Erythematous Silvery Scaling Plaque c/w Psoriasis     See annotation      Assessment / Plan:        Pruritus  Biopsy findings minimal and nonspecific  Recommend complete second dose ivermectin  Continue dry skin precautions and change personal hygiene products to those from "safe list"  Recommend take cymbalta and gabapentin as Rx  Continue elocon only bid prn     Xerosis cutis  Discussed the following dry skin tips:    Avoid hot baths and showers, keep showers or baths brief (10-15 min), use a mild soap or cleanser, pat skin dry after showering and apply a moisturizer within 3 minutes of getting out of bath (cetaphil cream, ceraVe, aquaphor) and reapply moisturizers 2-4 times/day.                Follow-up if symptoms worsen or fail to improve.  " PAST MEDICAL HISTORY:  CAD (coronary artery disease)     HLD (hyperlipidemia)     HTN (hypertension)     Ischemic cardiomyopathy     Myocardial infarct

## 2022-04-14 NOTE — H&P CARDIOLOGY - HISTORY OF PRESENT ILLNESS
This is a 76 year old  man with a PMH of ischemic CM and recent MI. The patient has a prior history of CAD including multiple stents over the years at [facility] (starting [id number] with 7 stents). The patient was in [place] and suffered a STEMI on 1/6/22. He was taken to the cath lab and found to have an occluded LAD. An attempt was made to to open the vessel with only sluggish flow. He had disease in his circumflex system and RCA as well. His EF post was 30-35% initially (It was 45% prior according to the patients wife). He was given a lifevest and returned to [place]. A more recent echo demonstrates an EF of 20% according to his wife. He was seen by the heart failure team at [place]. He did not tolerate Entresto due to hypotension (again according to the patients wife). I spoke with Dr. Richards who saw him recently for heart failure. She reviewed his angiogram with her team and it was felt that there was nothing to intervene upon with CABG or PCI.     Overall the patient is doing fairly well. He wants to continue cardiac rehab, but was having PVCs and this was put on hold. He denies palpitations, lightheadedness, pre-syncope or syncope. His BP today was 116/73 and his pulse 105. His ECG demonstrated sinus tachycardia at 108 and evidence of an mana-septal MI.   Presents here today for ICD implant

## 2022-04-14 NOTE — ASU DISCHARGE PLAN (ADULT/PEDIATRIC) - NS MD DC FALL RISK RISK
For information on Fall & Injury Prevention, visit: [email or web address] OR  [email or web address] OR  https://www.cdc.gov/steadi/patient.html

## 2022-04-14 NOTE — ASU PATIENT PROFILE, ADULT - FALL HARM RISK - UNIVERSAL INTERVENTIONS
Bed in lowest position, wheels locked, appropriate side rails in place/Call bell, personal items and telephone in reach/Instruct patient to call for assistance before getting out of bed or chair/Non-slip footwear when patient is out of bed/[place] to call system/Physically safe environment - no spills, clutter or unnecessary equipment/Purposeful Proactive Rounding/Room/bathroom lighting operational, light cord in reach

## 2022-04-20 PROBLEM — E78.5 HYPERLIPIDEMIA, UNSPECIFIED: Chronic | Status: ACTIVE | Noted: 2022-01-01

## 2022-04-20 PROBLEM — I25.5 ISCHEMIC CARDIOMYOPATHY: Chronic | Status: ACTIVE | Noted: 2022-01-01

## 2022-04-20 PROBLEM — I10 ESSENTIAL (PRIMARY) HYPERTENSION: Chronic | Status: ACTIVE | Noted: 2022-01-01

## 2022-04-20 PROBLEM — I21.9 ACUTE MYOCARDIAL INFARCTION, UNSPECIFIED: Chronic | Status: ACTIVE | Noted: 2022-01-01

## 2022-04-20 PROBLEM — I25.10 ATHEROSCLEROTIC HEART DISEASE OF NATIVE CORONARY ARTERY WITHOUT ANGINA PECTORIS: Chronic | Status: ACTIVE | Noted: 2022-01-01

## 2022-04-20 NOTE — ED PROVIDER NOTE - PHYSICAL EXAMINATION
GENERAL: non-toxic appearing, alert, in NAD  HEENT: atraumatic, normocephalic, Vision grossly intact, no conjunctivitis or discharge, hearing grossly intact,  no nasal discharge, epistaxis   CARDIAC: tachcyardic and regular, normal S1S2,  no appreciable murmurs, no cyanosis, cap refill < 2 seconds  PULM: normal work of breathing, oxygen saturation on RA wnl, bibasilar crackles with no wheeze  GI: abdomen nondistended, soft, nontender, no guarding or rebound tenderness, no palpable masses  NEURO: awake and alert, follows commands, normal speech, PERRLA, EOMI, no focal motor or sensory deficits  MSK: spine appears normal, no joint swelling or erythema, ranging all extremities with no appreciable loss of ROM  EXT: no peripheral edema, calf tenderness, redness or swelling  SKIN: L infraclavicular surgical wound, warm dry intact, nont-marshal. No rashes or bruising, petechia appreciated  PSYCH: appropriate mood and affect

## 2022-04-20 NOTE — CONSULT NOTE ADULT - SUBJECTIVE AND OBJECTIVE BOX
22 @ 14:39  [facility]-[id number]    CHIEF COMPLAINT:  Patient is a 76y old  Male who presents with a chief complaint of     HISTORY OF PRESENT ILLNESS:  GOLDIE GIBOBNS is a 76y Male patient with past medical history of *** presenting with ***.    Allergies    erythromycin (Unknown)  statins (Muscle Pain; Joint Pain)    Intolerances    	    PAST MEDICAL & SURGICAL HISTORY:  CAD (coronary artery disease)    Myocardial infarct    Ischemic cardiomyopathy    HTN (hypertension)    HLD (hyperlipidemia)    S/P exploratory laparotomy    S/P cardiac cath        FAMILY HISTORY:      Social History:      MEDICATIONS  Home Medications:  Aspirin Enteric Coated 81 mg oral delayed release tablet: 1 tab(s) orally once a day (2022 12:02)  K-Dur 10 oral tablet, extended release: 1 tab(s) orally once a day (2022 12:02)  Lasix 40 mg oral tablet: 1 tab(s) orally 2 times a day (2022 12:02)  losartan 25 mg oral tablet: 0.5 tab(s) orally once a day (2022 12:02)  Pepcid 20 mg oral tablet: 1 tab(s) orally 2 times a day (2022 12:02)  Plavix 75 mg oral tablet: 1 tab(s) orally once a day (2022 12:02)  Toprol-XL 25 mg oral tablet, extended release: 1 tab(s) orally once a day (2022 12:02)  Xanax 0.25 mg oral tablet: 2 tab(s) orally 3 times a day, As Needed (2022 12:02)      MEDICATIONS  (STANDING):    MEDICATIONS  (PRN):      REVIEW OF SYSTEMS:  CONSTITUTIONAL: No fever, weight loss, or fatigue  EYES: No eye pain, visual disturbances, or discharge  ENMT:  No difficulty hearing, tinnitus, vertigo; No sinus or throat pain  NECK: No pain or stiffness  RESPIRATORY: No cough, wheezing, chills or hemoptysis; No Shortness of Breath  CARDIOVASCULAR: No chest pain, palpitations, passing out, dizziness, or leg swelling  GASTROINTESTINAL: No abdominal or epigastric pain. No nausea, vomiting, or hematemesis; No diarrhea or constipation. No melena or hematochezia.  GENITOURINARY: No dysuria, frequency, hematuria, or incontinence  NEUROLOGICAL: No headaches, memory loss, loss of strength, numbness, or tremors  SKIN: No itching, burning, rashes, or lesions   LYMPH Nodes: No enlarged glands  ENDOCRINE: No heat or cold intolerance; No hair loss  MUSCULOSKELETAL: No joint pain or swelling; No muscle, back, or extremity pain  PSYCHIATRIC: No depression, anxiety, mood swings, or difficulty sleeping  HEME/LYMPH: No easy bruising, or bleeding gums  ALLERY AND IMMUNOLOGIC: No hives or eczema	    [ ] All others negative	  [ ] Unable to obtain    PHYSICAL EXAM:  Vital Signs Last 24 Hrs  T(C): 36.4 (2022 14:25), Max: 37.8 (2022 07:36)  T(F): 97.6 (2022 14:25), Max: 100.1 (2022 07:36)  HR: 108 (2022 14:25) (88 - 116)  BP: 125/79 (2022 14:25) (95/76 - 135/80)  BP(mean): --  RR: 20 (2022 14:25) (16 - 22)  SpO2: 98% (2022 14:25) (95% - 98%)    Orthostatic VS      Daily Height in cm: 177.8 (2022 06:31)    Daily Weight in k.8 (2022 14:18)    Appearance: Normal	  HEENT:   Normal oral mucosa, PERRL, EOMI	  Lymphatic: No lymphadenopathy  Cardiovascular: Normal S1 S2, No JVD, No murmurs, No edema  Respiratory: Lungs clear to auscultation	  Psychiatry: A & O x 3, Mood & affect appropriate  Gastrointestinal:  Soft, Non-tender, + BS	  Skin: No rashes, No ecchymoses, No cyanosis	  Neurologic: Non-focal  Extremities: Normal range of motion, No clubbing, cyanosis or edema  Vascular: Peripheral pulses palpable 2+ bilaterally    LABS:	 	                        12.7   10.36 )-----------( 198      ( 2022 06:55 )             37.9       04-20    140  |  101  |  18  ----------------------------<  133<H>  3.6   |  22  |  1.31<H>    Ca    9.5      2022 06:55    TPro  6.6  /  Alb  4.0  /  TBili  1.0  /  DBili  x   /  AST  9<L>  /  ALT  9<L>  /  AlkPhos  41                  BMI: BMI (kg/m2): 23.7 (22 @ 06:31)  HbA1c:   Glucose:   BP: 125/79 (22 @ 14:25) (95/76 - 135/80)  Lipid Panel:     I&O's Summary          RADIOLOGY:  CXR:  CT:  MRI:    CARDIAC TESTING/STUDIES:    Telemetry: 	    ECG:    Echocardiogram:  Stress Test:  	  Catheterization:  	  ASSESSMENT/PLAN: 	  76y Male patient with past medical history of *** presenting with ***.    ***    Kalyan Biggs MD, MPH, SHIMON, RPVI, [facility]  Inpatient Cardiovascular Specialist; [facility], [facility] ([facility])  ; [employer] at [facility]  message: [employer] [phone] or Microsoft Teams (text preferred and/or call)  email: [email or web address] and Cardiovascular Surgery on-service contact/call information, go to [email or web address] and use "[email or web address]" to login.  Outpatient Cardiology appointments, call  [phone] to arrange with a colleague; I do not have outpatient Cardiology clinic.  22 @ 14:39  [facility]-[id number]    CHIEF COMPLAINT:  CP    HISTORY OF PRESENT ILLNESS:  GOLDIE GIBBONS is a 76y Male patient with past medical history of HTN, HLD, CHFrEF (EF 20%), CAD s/p PCIx7 with recent AICD placement 22 (EP Dr. Mcdaniel) presenting with exacerbation of chronic chest pressure. ECG nonischemic; sinus tachycardic with occasional PVC. hs-trop 48. pBNP ~19,000. CXR small pleural effusions. Cardiology consulted for further management     Allergies  erythromycin (Unknown)  statins (Muscle Pain; Joint Pain)  	  PAST MEDICAL & SURGICAL HISTORY:  CAD (coronary artery disease)  Myocardial infarct  Ischemic cardiomyopathy  HTN (hypertension)  HLD (hyperlipidemia)  S/P exploratory laparotomy  S/P cardiac cath    FAMILY HISTORY:  NC    Social History:  Np drug use    MEDICATIONS  Home Medications:  Aspirin Enteric Coated 81 mg oral delayed release tablet: 1 tab(s) orally once a day (2022 12:02)  K-Dur 10 oral tablet, extended release: 1 tab(s) orally once a day (2022 12:02)  Lasix 40 mg oral tablet: 1 tab(s) orally 2 times a day (2022 12:02)  losartan 25 mg oral tablet: 0.5 tab(s) orally once a day (2022 12:02)  Pepcid 20 mg oral tablet: 1 tab(s) orally 2 times a day (2022 12:02)  Plavix 75 mg oral tablet: 1 tab(s) orally once a day (2022 12:02)  Toprol-XL 25 mg oral tablet, extended release: 1 tab(s) orally once a day (2022 12:02)  Xanax 0.25 mg oral tablet: 2 tab(s) orally 3 times a day, As Needed (2022 12:02)      MEDICATIONS  (STANDING):    MEDICATIONS  (PRN):      REVIEW OF SYSTEMS:  CONSTITUTIONAL: No fever, weight loss, or fatigue  EYES: No eye pain, visual disturbances, or discharge  ENMT:  No difficulty hearing, tinnitus, vertigo; No sinus or throat pain  NECK: No pain or stiffness  RESPIRATORY: No cough, wheezing, chills or hemoptysis; No Shortness of Breath  CARDIOVASCULAR: No chest pain, palpitations, passing out, dizziness, or leg swelling  GASTROINTESTINAL: No abdominal or epigastric pain. No nausea, vomiting, or hematemesis; No diarrhea or constipation. No melena or hematochezia.  GENITOURINARY: No dysuria, frequency, hematuria, or incontinence  NEUROLOGICAL: No headaches, memory loss, loss of strength, numbness, or tremors  SKIN: No itching, burning, rashes, or lesions   LYMPH Nodes: No enlarged glands  ENDOCRINE: No heat or cold intolerance; No hair loss  MUSCULOSKELETAL: No joint pain or swelling; No muscle, back, or extremity pain  PSYCHIATRIC: No depression, anxiety, mood swings, or difficulty sleeping  HEME/LYMPH: No easy bruising, or bleeding gums  ALLERY AND IMMUNOLOGIC: No hives or eczema	    [ ] All others negative	  [ ] Unable to obtain    PHYSICAL EXAM:  Vital Signs Last 24 Hrs  T(C): 36.4 (2022 14:25), Max: 37.8 (2022 07:36)  T(F): 97.6 (2022 14:25), Max: 100.1 (2022 07:36)  HR: 108 (2022 14:25) (88 - 116)  BP: 125/79 (2022 14:25) (95/76 - 135/80)  BP(mean): --  RR: 20 (2022 14:25) (16 - 22)  SpO2: 98% (2022 14:25) (95% - 98%)    Orthostatic VS      Daily Height in cm: 177.8 (2022 06:31)    Daily Weight in k.8 (2022 14:18)    Appearance: Normal	  HEENT:   Normal oral mucosa, PERRL, EOMI	  Lymphatic: No lymphadenopathy  Cardiovascular: Normal S1 S2, No JVD, No murmurs, No edema  Respiratory: Lungs clear to auscultation	  Psychiatry: A & O x 3, Mood & affect appropriate  Gastrointestinal:  Soft, Non-tender, + BS	  Skin: No rashes, No ecchymoses, No cyanosis	  Neurologic: Non-focal  Extremities: Normal range of motion, No clubbing, cyanosis or edema  Vascular: Peripheral pulses palpable 2+ bilaterally    LABS:	 	                        12.7   10.36 )-----------( 198      ( 2022 06:55 )             37.9       04-20    140  |  101  |  18  ----------------------------<  133<H>  3.6   |  22  |  1.31<H>    Ca    9.5      2022 06:55    TPro  6.6  /  Alb  4.0  /  TBili  1.0  /  DBili  x   /  AST  9<L>  /  ALT  9<L>  /  AlkPhos  41                  BMI: BMI (kg/m2): 23.7 (22 @ 06:31)  HbA1c:   Glucose:   BP: 125/79 (22 @ 14:25) (95/76 - 135/80)  Lipid Panel:     I&O's Summary          RADIOLOGY:  CXR:  CT:  MRI:    CARDIAC TESTING/STUDIES:    Telemetry: 	    ECG:    Echocardiogram:  Stress Test:  	  Catheterization:  	  ASSESSMENT/PLAN: 	  76y Male patient with past medical history of *** presenting with ***.    ***    Kalyan Biggs MD, MPH, SHIMON, RPVI, [facility]  Inpatient Cardiovascular Specialist; [facility], [facility] ([facility])  ; [employer] at [facility]  message: [employer] [phone] or Microsoft Teams (text preferred and/or call)  email: [email or web address] Cardiology and Cardiovascular Surgery on-service contact/call information, go to [email or web address] and use "[email or web address]" to login.  Outpatient Cardiology appointments, call  [phone] to arrange with a colleague; I do not have outpatient Cardiology clinic.  22 @ 14:39  [facility]-[id number]    CHIEF COMPLAINT:  CP    HISTORY OF PRESENT ILLNESS:  GOLDIE GIBBONS is a 76y Male patient with past medical history of HTN, HLD, CHFrEF (EF 20%), CAD s/p PCIx7 with recent AICD placement 22 (EP Dr. Mcdaniel) presenting with mild acute on chronic decompensated diastolic and systolic congestive heart failure associated with exacerbation of chronic chest pressure, non-radiating, transient, non-exertional. ECG nonischemic; sinus tachycardic with occasional PVC. hs-trop 48. pBNP ~19,000. CXR small pleural effusions. Cardiology consulted for further management.     Allergies  erythromycin (Unknown)  statins (Muscle Pain; Joint Pain)  	  PAST MEDICAL & SURGICAL HISTORY:  CAD (coronary artery disease)  Myocardial infarct  Ischemic cardiomyopathy  HTN (hypertension)  HLD (hyperlipidemia)  S/P exploratory laparotomy  S/P cardiac cath    FAMILY HISTORY:  NC    Social History:  Np drug use    MEDICATIONS  Home Medications:  Aspirin Enteric Coated 81 mg oral delayed release tablet: 1 tab(s) orally once a day (2022 12:02)  K-Dur 10 oral tablet, extended release: 1 tab(s) orally once a day (2022 12:02)  Lasix 40 mg oral tablet: 1 tab(s) orally 2 times a day (2022 12:02)  losartan 25 mg oral tablet: 0.5 tab(s) orally once a day (2022 12:02)  Pepcid 20 mg oral tablet: 1 tab(s) orally 2 times a day (2022 12:02)  Plavix 75 mg oral tablet: 1 tab(s) orally once a day (2022 12:02)  Toprol-XL 25 mg oral tablet, extended release: 1 tab(s) orally once a day (2022 12:02)  Xanax 0.25 mg oral tablet: 2 tab(s) orally 3 times a day, As Needed (2022 12:02)    REVIEW OF SYSTEMS:  CONSTITUTIONAL: No fever, weight loss, or fatigue  EYES: No eye pain, visual disturbances, or discharge  ENMT:  No difficulty hearing, tinnitus, vertigo; No sinus or throat pain  NECK: No pain or stiffness  RESPIRATORY: No cough, wheezing, chills or hemoptysis; No Shortness of Breath  CARDIOVASCULAR: No palpitations, passing out, dizziness, or leg swelling/ POSITIVE chest pain.   GASTROINTESTINAL: No abdominal or epigastric pain. No nausea, vomiting, or hematemesis; No diarrhea or constipation. No melena or hematochezia.  GENITOURINARY: No dysuria, frequency, hematuria, or incontinence  NEUROLOGICAL: No headaches, memory loss, loss of strength, numbness, or tremors  SKIN: No itching, burning, rashes, or lesions   LYMPH Nodes: No enlarged glands  ENDOCRINE: No heat or cold intolerance; No hair loss  MUSCULOSKELETAL: No joint pain or swelling; No muscle, back, or extremity pain  PSYCHIATRIC: No depression, anxiety, mood swings, or difficulty sleeping  HEME/LYMPH: No easy bruising, or bleeding gums  ALLERY AND IMMUNOLOGIC: No hives or eczema	    PHYSICAL EXAM:  Vital Signs Last 24 Hrs  T(C): 36.4 (2022 14:25), Max: 37.8 (2022 07:36)  T(F): 97.6 (2022 14:25), Max: 100.1 (2022 07:36)  HR: 108 (2022 14:25) (88 - 116)  BP: 125/79 (2022 14:25) (95/76 - 135/80)  BP(mean): --  RR: 20 (2022 14:25) (16 - 22)  SpO2: 98% (2022 14:25) (95% - 98%)    Daily Height in cm: 177.8 (2022 06:31)    Daily Weight in k.8 (2022 14:18)    Appearance: Normal	  HEENT:   Normal oral mucosa	  Lymphatic: No lymphadenopathy  Cardiovascular: Normal S1 S2, No murmurs, No edema  Respiratory: Lungs clear to auscultation	  Psychiatry: A & O x 3  Gastrointestinal:  Soft, Non-tender  Skin: No rashes, No ecchymoses, No cyanosis	  Neurologic: Non-focal  Extremities: Normal range of motion, No clubbing, cyanosis or edema  Vascular: Peripheral pulses palpable 2+ bilaterally    LABS:	                        12.7   10.36 )-----------( 198      ( 2022 06:55 )             37.9     04-20    140  |  101  |  18  ----------------------------<  133<H>  3.6   |  22  |  1.31<H>    Ca    9.5      2022 06:55    TPro  6.6  /  Alb  4.0  /  TBili  1.0  /  DBili  x   /  AST  9<L>  /  ALT  9<L>  /  AlkPhos  41      BMI: BMI (kg/m2): 23.7 (22 @ 06:31)  BP: 125/79 (22 @ 14:25) (95/76 - 135/80)    RADIOLOGY:  CXR: 2022  IMPRESSION:  Small bilateral pleural effusions and basilar subsegmental atelectasis.    CARDIAC TESTING/STUDIES:    EC2022  SINUS TACHYCARDIA WITH FREQUENT PREMATURE VENTRICULAR COMPLEXES  ANTEROLATERAL INFARCT (CITED ON OR BEFORE 2022)  ABNORMAL ECG  WHEN COMPARED WITH ECG OF 2022 07:00,  NO SIGNIFICANT CHANGE WAS FOUND    ICD Implant 2022  Procedures Performed   Primary Procedures:      ICD Implants     Conclusions   Successful single chamber ICD implant     Follow-Up   ECG   CXR   Pair/educate for remote monitoring   Follow-up in device clinic in 10-14 days   continue prior meds     Complications   No complications   	  ASSESSMENT/PLAN: 	  76y Male patient with past medical history of HTN, HLD, CHFrEF (EF 20%), CAD s/p PCIx7 presenting with mild acute on chronic decompensated diastolic and systolic congestive heart failure associated with exacerbation of chronic chest pressure, in the setting of recent AICD placement 22 (EP Dr. Mcdaniel).     1) Mild acute on chronic decompensated diastolic and systolic congestive heart failure  pBNP eleavated  Imaging consistent with volume overload.     ·	Start diuresis with IF furosmide 40-80 mg daily nad titrate as needed for net negative 0.5-1 liter/day.   ·	Repleate elecetrolytes, daily weights, I/O  ·	pBNP for trend intermittently yo assess response.   ·	Continue medical management with losartan 12.5-25 mg daily metoprolol succinate 25 mg daily.  ·	Suggest limited ECHO to assess for pericardial effusion     2) CAD   Stable   hs-trop 48    ·	Order addition set of cardiac biomarkers wit CKMB   ·	Continue medical management with aspirin 81 mg daily and clopidogrel 75 mg daily.     Kalyan Biggs MD, MPH, SHIMON, RPVI, FACC  Inpatient Cardiovascular Specialist; [facility], [facility] ([facility])  ; [employer] at [facility]  message: [employer] [phone] or Microsoft Teams (text preferred and/or call)  email: [email or web address] Cardiology and Cardiovascular Surgery on-service contact/call information, go to [email or web address] and use "[email or web address]" to login.  Outpatient Cardiology appointments, call  [phone] to arrange with a colleague; I do not have outpatient Cardiology clinic.

## 2022-04-20 NOTE — ED PROVIDER NOTE - NSICDXPASTMEDICALHX_GEN_ALL_CORE_FT
PAST MEDICAL HISTORY:  CAD (coronary artery disease)     HLD (hyperlipidemia)     HTN (hypertension)     Ischemic cardiomyopathy     Myocardial infarct

## 2022-04-20 NOTE — H&P ADULT - NSHPLABSRESULTS_GEN_ALL_CORE
12.7   10.36 )-----------( 198      ( 20 Apr 2022 06:55 )             37.9     04-20    140  |  101  |  18  ----------------------------<  133<H>  3.6   |  22  |  1.31<H>    Ca    9.5      20 Apr 2022 06:55    TPro  6.6  /  Alb  4.0  /  TBili  1.0  /  DBili  x   /  AST  9<L>  /  ALT  9<L>  /  AlkPhos  41  04-20

## 2022-04-20 NOTE — H&P ADULT - HISTORY OF PRESENT ILLNESS
77yo M pmh HTN/HLD, CHFrEF (EF 20%)CAD s/p PCIx7 with recent AICD placement 4/14/22 (EP Dr. Mcdaniel) presents to ED with chest pressure over night. Cp is substernal, at rest, severe, pressure in quality without radiation in setting of 2 days of weakness and fatigue. A/w SOB. Reports missed one dose of lasix yesterday, normally takes 80mg daily. Denies lower extremity swelling, chest pain resolved now and breathing is improved after IV lasix   pt. is seen by cardio in ED

## 2022-04-20 NOTE — ED ADULT NURSE REASSESSMENT NOTE - NS ED NURSE REASSESS COMMENT FT1
received pt in assigned area a&Xo3 on cardiac monitor, pt initially had chest pain non radiating, h/o 7 cardiac stents,  pt currently denies any chest pain, sob, dizziness or fever, PT on cardiac monitor, bp 95/76, Lasix 80mg dose not given at this time due to BP, MD is aware, resps even ad non labored, IVL is intact to rt a/c, skin intact, family at bedside, will continue to monitor patient

## 2022-04-20 NOTE — H&P ADULT - ASSESSMENT
A/P    Chest pain r/o ACS   -serial CE   seen by cardio     Ac on ch systolic heart failure   -restarted on IV lasix 40 mg q 12   breathing improved   missed lasix dose yesterday   s/p AICD placed last week     Anxiety d/o   c/w xanax at night     HTN/HLD   -c/w home meds     advance carte planning : d/w patient and spouse bedside , d/w him regarding Intubation / CPR if the need arise. agree for everything . remain full code. time spend 20 min

## 2022-04-20 NOTE — ED PROVIDER NOTE - CLINICAL SUMMARY MEDICAL DECISION MAKING FREE TEXT BOX
77yo M with CHF, CAD and recent AICD placement now with substernal chest pain shortness of breath for multiple hours at rest over night and fatigue weakness for 2 days. Differential includes acs, chf exacerbation, surgical site infection, PE. Currently well appearing however hypertensive, tachycardic. B/l basilar crackles on exam with no peripheral edema. NO respiratory distress. Will proceed with cardiac work up, will need echo and EP consult. labs, cxr, ekg pendign.

## 2022-04-20 NOTE — ED ADULT TRIAGE NOTE - CHIEF COMPLAINT QUOTE
Pt BIBEMS c/o SOB.  + substernal chest pain.  Pt had pacemaker placed last Thursday.  pt given 4 baby ASA, 1 SL nitro

## 2022-04-20 NOTE — H&P ADULT - NSHPPHYSICALEXAM_GEN_ALL_CORE
pt. een and examined, spouse bedside     Vital Signs Last 24 Hrs  T(C): 36.7 (20 Apr 2022 20:25), Max: 37.8 (20 Apr 2022 07:36)  T(F): 98.1 (20 Apr 2022 20:25), Max: 100.1 (20 Apr 2022 07:36)  HR: 107 (20 Apr 2022 20:25) (88 - 116)  BP: 98/55 (20 Apr 2022 20:25) (95/76 - 135/80)  BP(mean): --  RR: 18 (20 Apr 2022 20:25) (16 - 22)  SpO2: 96% (20 Apr 2022 20:25) (95% - 98%)    heent : nc/at   neck : supple, no JVD  lungs : B/L fair A/E , no w/r/, basilar rales   Heart : s1s2 nml , left side chest : AICD +  abd : soft, NABS , NT/ND  ext : no e/c/c, pulses 2 +  neuro: aaox3 , no focal deficit   skin : warm , no rash

## 2022-04-20 NOTE — PATIENT PROFILE ADULT - FALL HARM RISK - UNIVERSAL INTERVENTIONS
Bed in lowest position, wheels locked, appropriate side rails in place/Call bell, personal items and telephone in reach/Instruct patient to call for assistance before getting out of bed or chair/Non-slip footwear when patient is out of bed/[place] to call system/Physically safe environment - no spills, clutter or unnecessary equipment/Purposeful Proactive Rounding/Room/bathroom lighting operational, light cord in reach

## 2022-04-20 NOTE — ED ADULT NURSE NOTE - OBJECTIVE STATEMENT
76 year old Male, PMH: MI x2- latest 1/2022, CAD, HTN, HLD. PSH: cardiac stents x7, defib/cath placed 4/14. Patient comes to the ER with sternal, nonradiating chest pain since midnight. Given x1 nitro by EMS, took 4 baby aspirin at home. Patient reports decrease in chest pain since taking medications, slight shortness of breath. A&Ox4, breathing spontaneous and unlabored, palpable pulses, speaking in complete sentences. Bed locked and in lowest position for safety.

## 2022-04-20 NOTE — ED PROVIDER NOTE - OBJECTIVE STATEMENT
77yo M pmh HTN/HLD, CHFrEF (EF 20%)CAD s/p PCIx7 with recent AICD placement (EP Dr. Mcdaniel) presents to 75yo M pmh HTN/HLD, CHFrEF (EF 20%)CAD s/p PCIx7 with recent AICD placement 4/14/22 (EP Dr. Mcdaniel) presents to ED with chest pressure over night. Cp is substernal, at rest, severe, pressure in quality without radiation in setting of 2 days of weakness and fatigue. A/w SOB. Reports missed one dose of lasix yesterday, normally takes 80mg daily. Denies lower extremity swelling, chest pain resolved a few minutes prior to current interview.

## 2022-04-21 NOTE — DISCHARGE NOTE PROVIDER - NSDCFUADDAPPT_GEN_ALL_CORE_FT
Please follow up with EP on 04/26/2022 at 11:40 AM at [facility]/[address] for your AICD wound check     APPTS ARE READY TO BE MADE: [x ] YES    Best Family or Patient Contact (if needed):    Additional Information about above appointments (if needed):    1: Armando, Dr. Gregorio in a week  2: PCP, Nicolasa Shepard in a week   3:     Other comments or requests:    Please follow up with EP on 04/26/2022 at 11:40 AM at [facility]/[address] for your AICD wound check     APPTS ARE READY TO BE MADE: [x ] YES    Best Family or Patient Contact (if needed):    Additional Information about above appointments (if needed):    1: Dr. Darline Roberts in a week  2: Maurice Lomas in 1-2 weeks ([phone] or [phone], to have earlier availability)  3: PCP, Nicolasa Shepard in a week     Other comments or requests:

## 2022-04-21 NOTE — DISCHARGE NOTE PROVIDER - HOSPITAL COURSE
76y Male patient with past medical history of HTN, HLD, CHFrEF (EF 20%), CAD s/p PCIx7 presenting with mild acute on chronic decompensated diastolic and systolic congestive heart failure associated with exacerbation of chronic chest pressure, in the setting of recent AICD placement 4/14/22 (EP Dr. Mcdaniel). Patient seen and evaluated by Cards. s/p IV Lasix with clinical improvement. Patient remains stable for DC with close follow up with PCP and Cards as per Dr. Lino

## 2022-04-21 NOTE — PROGRESS NOTE ADULT - SUBJECTIVE AND OBJECTIVE BOX
Patient is a 76y old  Male who presents with a chief complaint of hest heaviness / pain associated with SOB (21 Apr 2022 16:25)      INTERVAL HPI/OVERNIGHT EVENTS: doing well , breathing better , had some WALDRON this am , no CP    T(C): 36.8 (04-21-22 @ 20:47), Max: 36.8 (04-21-22 @ 20:47)  HR: 105 (04-21-22 @ 21:31) (83 - 108)  BP: 96/65 (04-21-22 @ 21:31) (84/59 - 104/71)  RR: 16 (04-21-22 @ 20:47) (16 - 18)  SpO2: 95% (04-21-22 @ 20:47) (95% - 96%)  Wt(kg): --  I&O's Summary    20 Apr 2022 07:01  -  21 Apr 2022 07:00  --------------------------------------------------------  IN: 0 mL / OUT: 1500 mL / NET: -1500 mL    21 Apr 2022 07:01  -  21 Apr 2022 21:51  --------------------------------------------------------  IN: 825 mL / OUT: 1800 mL / NET: -975 mL        PAST MEDICAL & SURGICAL HISTORY:  CAD (coronary artery disease)    Myocardial infarct    Ischemic cardiomyopathy    HTN (hypertension)    HLD (hyperlipidemia)    S/P exploratory laparotomy    S/P cardiac cath        SOCIAL HISTORY  Alcohol:  Tobacco:  Illicit substance use:    FAMILY HISTORY:    REVIEW OF SYSTEMS:  CONSTITUTIONAL: No fever, weight loss, or fatigue  EYES: No eye pain, visual disturbances, or discharge  ENMT:  No difficulty hearing, tinnitus, vertigo; No sinus or throat pain  NECK: No pain or stiffness  RESPIRATORY: No cough, wheezing, chills or hemoptysis; No shortness of breath  CARDIOVASCULAR: No chest pain, palpitations, dizziness, or leg swelling  GASTROINTESTINAL: No abdominal or epigastric pain. No nausea, vomiting, or hematemesis; No diarrhea or constipation. No melena or hematochezia.  GENITOURINARY: No dysuria, frequency, hematuria, or incontinence  NEUROLOGICAL: No headaches, memory loss, loss of strength, numbness, or tremors  SKIN: No itching, burning, rashes, or lesions   LYMPH NODES: No enlarged glands  ENDOCRINE: No heat or cold intolerance; No hair loss  MUSCULOSKELETAL: No joint pain or swelling; No muscle, back, or extremity pain  PSYCHIATRIC: No depression, anxiety, mood swings, or difficulty sleeping  HEME/LYMPH: No easy bruising, or bleeding gums  ALLERY AND IMMUNOLOGIC: No hives or eczema    RADIOLOGY & ADDITIONAL TESTS:    Imaging Personally Reviewed:  [ ] YES  [ ] NO    Consultant(s) Notes Reviewed:  [ ] YES  [ ] NO    PHYSICAL EXAM:  GENERAL: NAD, well-groomed, well-developed  HEAD:  Atraumatic, Normocephalic  EYES: EOMI, PERRLA, conjunctiva and sclera clear  ENMT: No tonsillar erythema, exudates, or enlargement; Moist mucous membranes, Good dentition, No lesions  NECK: Supple, No JVD, Normal thyroid  NERVOUS SYSTEM:  Alert & Oriented X3, Good concentration; Motor Strength 5/5 B/L upper and lower extremities; DTRs 2+ intact and symmetric  CHEST/LUNG: Clear to percussion bilaterally; No rales, rhonchi, wheezing, or rubs  HEART: Regular rate and rhythm; No murmurs, rubs, or gallops  ABDOMEN: Soft, Nontender, Nondistended; Bowel sounds present  EXTREMITIES:  2+ Peripheral Pulses, No clubbing, cyanosis, or edema  LYMPH: No lymphadenopathy noted  SKIN: No rashes or lesions    LABS:                        12.4   7.93  )-----------( 186      ( 21 Apr 2022 06:32 )             36.7     04-21    137  |  101  |  18  ----------------------------<  113<H>  3.4<L>   |  20<L>  |  1.14    Ca    9.4      21 Apr 2022 06:29    TPro  6.5  /  Alb  3.7  /  TBili  1.2  /  DBili  x   /  AST  12  /  ALT  7<L>  /  AlkPhos  38<L>  04-21        CAPILLARY BLOOD GLUCOSE                MEDICATIONS  (STANDING):  ALPRAZolam 0.25 milliGRAM(s) Oral at bedtime  aspirin enteric coated 81 milliGRAM(s) Oral daily  clopidogrel Tablet 75 milliGRAM(s) Oral daily  furosemide   Injectable 40 milliGRAM(s) IV Push two times a day  heparin   Injectable 5000 Unit(s) SubCutaneous every 12 hours  metoprolol succinate ER 25 milliGRAM(s) Oral daily  pantoprazole    Tablet 40 milliGRAM(s) Oral before breakfast    MEDICATIONS  (PRN):      Care Discussed with Consultants/Other Providers [ ] YES  [ ] NO

## 2022-04-21 NOTE — DISCHARGE NOTE PROVIDER - NSDCCPCAREPLAN_GEN_ALL_CORE_FT
PRINCIPAL DISCHARGE DIAGNOSIS  Diagnosis: CHF exacerbation  Assessment and Plan of Treatment: You received IV Lasix twice per day at the hospital.   [place] [address]. Please follow up with your Cards in a week for further workup and management   -  Weigh yourself daily.  If you gain 3lbs in 3 days, or 5lbs in a week call your Health Care Provider.  Do not eat or drink foods containing more than 2000mg of salt (sodium) in your diet every day.  Call your Health Care Provider if you have any swelling or increased swelling in your feet, ankles, and/or stomach.  Take all of your medication as directed.  If you become dizzy call your Health Care Provider.      SECONDARY DISCHARGE DIAGNOSES  Diagnosis: HOLLI (acute kidney injury)  Assessment and Plan of Treatment: Your serum creatinin is 1.34. Please follow up with your PCP to repeat blood work to monitor your stability of kidney function   Avoid taking (NSAIDs) - (ex: Ibuprofen, Advil, Celebrex, Naprosyn)  Avoid taking any nephrotoxic agents (can harm kidneys) - Intravenous contrast for diagnostic testing, combination cold medications.  Have all medications adjusted for your renal function by your Health Care Provider.  Blood pressure control is important.  Take all medication as prescribed.

## 2022-04-21 NOTE — DISCHARGE NOTE PROVIDER - CARE PROVIDER_API CALL
Souleymane Gregorio  Cardiovascular Diseases  [address] [place] [id number]  Phone: [phone]  Fax: [phone]  Follow Up Time: 1 week   Souleymane Gregorio  Cardiovascular Diseases  [address] [place] [id number]  Phone: [phone]  Fax: [phone]  Follow Up Time: 1 week    GRABIEL CAMPOS  Cardiology  [address] [place] [address]  Phone: [phone]  Fax: [phone]  Follow Up Time:

## 2022-04-21 NOTE — DISCHARGE NOTE PROVIDER - PROVIDER TOKENS
PROVIDER:[TOKEN:[[id number]],FOLLOWUP:[1 week]] PROVIDER:[TOKEN:[[id number]],FOLLOWUP:[1 week]],PROVIDER:[TOKEN:[[id number]]]

## 2022-04-21 NOTE — PROGRESS NOTE ADULT - ASSESSMENT
A/P    Chest pain r/o ACS   -serial CE   seen by cardio     Ac on ch systolic heart failure   -restarted on IV lasix 40 mg q 12   breathing improved   missed lasix dose yesterday   s/p AICD placed last week     Anxiety d/o   c/w xanax at night     HTN/HLD   -c/w home meds     dispo: doing well, plan for d/c home in am on lasix 40 mg bid

## 2022-04-21 NOTE — DISCHARGE NOTE PROVIDER - NSDCMRMEDTOKEN_GEN_ALL_CORE_FT
Aspirin Enteric Coated 81 mg oral delayed release tablet: 1 tab(s) orally once a day  K-Dur 10 oral tablet, extended release: 1 tab(s) orally once a day  Lasix 40 mg oral tablet: 1 tab(s) orally 2 times a day  losartan 25 mg oral tablet: 0.5 tab(s) orally once a day  Pepcid 20 mg oral tablet: 1 tab(s) orally 2 times a day  Plavix 75 mg oral tablet: 1 tab(s) orally once a day  Toprol-XL 25 mg oral tablet, extended release: 1 tab(s) orally once a day  Xanax 0.25 mg oral tablet: 2 tab(s) orally 3 times a day, As Needed

## 2022-04-22 NOTE — DISCHARGE NOTE NURSING/CASE MANAGEMENT/SOCIAL WORK - NSDCPEFALRISK_GEN_ALL_CORE
For information on Fall & Injury Prevention, visit: [email or web address] OR  [email or web address] OR  https://www.cdc.gov/steadi/patient.html

## 2022-04-22 NOTE — DISCHARGE NOTE NURSING/CASE MANAGEMENT/SOCIAL WORK - NSDCFUADDAPPT_GEN_ALL_CORE_FT
Please follow up with EP on 04/26/2022 at 11:40 AM at [facility]/[address] for your AICD wound check     APPTS ARE READY TO BE MADE: [x ] YES    Best Family or Patient Contact (if needed):    Additional Information about above appointments (if needed):    1: Dr. Darline Roberts in a week  2: Maurice Lomas in 1-2 weeks ([phone] or [phone], to have earlier availability)  3: PCP, Nicolasa Shepard in a week     Other comments or requests:

## 2022-07-26 ENCOUNTER — APPOINTMENT (OUTPATIENT)
Dept: ELECTROPHYSIOLOGY | Facility: CLINIC | Age: 77
End: 2022-07-26

## 2022-08-10 ENCOUNTER — APPOINTMENT (OUTPATIENT)
Dept: ELECTROPHYSIOLOGY | Facility: CLINIC | Age: 77
End: 2022-08-10

## 2024-10-05 NOTE — DISCHARGE NOTE PROVIDER - NSDCCPTREATMENT_GEN_ALL_CORE_FT
PRINCIPAL PROCEDURE  Procedure: Transthoracic echocardiography (TTE)  Findings and Treatment: EF 15-20%  1. Severe  left ventricular systolic dysfunction.  2. Decreased right ventricular systolic function.   A  device wire is noted in the right heart.  3. No pericardial effusion seen.  4. Right pleural effusion.       -- Pt with excellent PO intakes in-house, endorses enjoying the cream of wheat and grapes. Food preferences with up RD during initial assessment visit

## 2024-11-26 NOTE — H&P CARDIOLOGY - PULMONARY EMBOLUS
Requested Prescriptions     Pending Prescriptions Disp Refills    hydrOXYzine HCl (ATARAX) 25 MG tablet 30 tablet 0     Sig: Take 1 tablet by mouth 3 times daily as needed for Anxiety     Date of last OV:09/24/2024  Future OV visit scheduled:  [] Yes -> Date:   [x] No    Last Refill: [] N/A  Date:09/24/24  Qty:30  # of refills:0    Med pending for provider review:  [x] Yes  [] No (provide reason why):     Requested Pharmacy updated in [facility]:  [x] Yes    Applicable labs (provide date of completion):  [] Diabetic med- A1c:  [] Cholesterol med- Lipids:  [x] BP med- CMP or BMP:   [] Thyroid med- TSH:  [] Gout med- Uric acid:  [] Prostate med- PSA:  [] Other (provide what type of med and lab):  09/24/24  Additional notes:     no
